# Patient Record
Sex: MALE | URBAN - METROPOLITAN AREA
[De-identification: names, ages, dates, MRNs, and addresses within clinical notes are randomized per-mention and may not be internally consistent; named-entity substitution may affect disease eponyms.]

---

## 2017-08-09 ENCOUNTER — IMPORTED ENCOUNTER (OUTPATIENT)
Dept: URBAN - METROPOLITAN AREA CLINIC 59 | Facility: CLINIC | Age: 47
End: 2017-08-09

## 2017-08-09 PROBLEM — H10.45 OTHER CHRONIC ALLERGIC CONJUNCTIVITIS: Noted: 2017-08-09

## 2017-08-09 PROBLEM — L71.8 OTHER ROSACEA: Noted: 2017-08-09

## 2017-08-09 PROBLEM — H01.024 SQUAMOUS BLEPHARITIS OF LT UPPER EYELID: Noted: 2017-08-09

## 2017-08-09 PROBLEM — I10 PRIMARY HYPERTENSION: Noted: 2017-08-09

## 2017-08-09 PROBLEM — H04.123 TEAR FILM INSUFFICIENCY OF BILATERAL LACRIMAL GLANDS: Noted: 2017-08-09

## 2017-08-09 PROBLEM — H01.021 SQUAMOUS BLEPHARITIS RIGHT UPPER EYELID: Noted: 2017-08-09

## 2017-08-09 PROCEDURE — 99213 OFFICE O/P EST LOW 20 MIN: CPT

## 2019-07-22 ENCOUNTER — IMPORTED ENCOUNTER (OUTPATIENT)
Dept: URBAN - METROPOLITAN AREA CLINIC 59 | Facility: CLINIC | Age: 49
End: 2019-07-22

## 2019-07-22 PROBLEM — H01.024 SQUAMOUS BLEPHARITIS OF LT UPPER EYELID: Noted: 2019-07-22

## 2019-07-22 PROBLEM — H01.021 SQUAMOUS BLEPHARITIS RIGHT UPPER EYELID: Noted: 2019-07-22

## 2019-07-22 PROBLEM — I10 PRIMARY HYPERTENSION: Noted: 2019-07-22

## 2019-07-22 PROBLEM — H10.45 OTHER CHRONIC ALLERGIC CONJUNCTIVITIS: Noted: 2019-07-22

## 2019-07-22 PROCEDURE — 92015 DETERMINE REFRACTIVE STATE: CPT

## 2019-07-22 PROCEDURE — 99213 OFFICE O/P EST LOW 20 MIN: CPT

## 2021-07-20 ENCOUNTER — IMPORTED ENCOUNTER (OUTPATIENT)
Dept: URBAN - METROPOLITAN AREA CLINIC 59 | Facility: CLINIC | Age: 51
End: 2021-07-20

## 2021-07-20 PROBLEM — I10 PRIMARY HYPERTENSION: Noted: 2021-07-20

## 2021-07-20 PROBLEM — H01.021 SQUAMOUS BLEPHARITIS RIGHT UPPER EYELID: Noted: 2021-07-20

## 2021-07-20 PROBLEM — H01.024 SQUAMOUS BLEPHARITIS OF LT UPPER EYELID: Noted: 2021-07-20

## 2021-07-20 PROBLEM — H10.45 OTHER CHRONIC ALLERGIC CONJUNCTIVITIS: Noted: 2021-07-20

## 2021-07-20 PROCEDURE — 99213 OFFICE O/P EST LOW 20 MIN: CPT

## 2021-07-20 PROCEDURE — 92015 DETERMINE REFRACTIVE STATE: CPT

## 2023-10-21 ASSESSMENT — VISUAL ACUITY
OU_CC: 20/30
OD_CC: 20/20
OD_CC: 20/20-1
OD_CC: 20/20
OS_CC: 20/20
OS_CC: 20/20
OS_CC: 20/20-1

## 2023-10-21 ASSESSMENT — TONOMETRY
OD_IOP_MMHG: 15
OD_IOP_MMHG: 16
OS_IOP_MMHG: 15
OS_IOP_MMHG: 16

## 2025-03-19 ENCOUNTER — APPOINTMENT (OUTPATIENT)
Dept: URBAN - METROPOLITAN AREA CLINIC 341 | Facility: CLINIC | Age: 55
Setting detail: DERMATOLOGY
End: 2025-03-19

## 2025-03-19 DIAGNOSIS — L259 CONTACT DERMATITIS AND OTHER ECZEMA, UNSPECIFIED CAUSE: ICD-10-CM | Status: INADEQUATELY CONTROLLED

## 2025-03-19 PROBLEM — L30.8 OTHER SPECIFIED DERMATITIS: Status: ACTIVE | Noted: 2025-03-19

## 2025-03-19 PROCEDURE — ? MDM - TREATMENT GOALS

## 2025-03-19 PROCEDURE — ? COUNSELING

## 2025-03-19 PROCEDURE — ? PRESCRIPTION MEDICATION MANAGEMENT

## 2025-03-19 PROCEDURE — 99204 OFFICE O/P NEW MOD 45 MIN: CPT

## 2025-03-19 PROCEDURE — ? PRESCRIPTION

## 2025-03-19 RX ORDER — TRIAMCINOLONE ACETONIDE 1 MG/G
CREAM TOPICAL
Qty: 80 | Refills: 3 | Status: ERX | COMMUNITY
Start: 2025-03-19

## 2025-03-19 RX ADMIN — TRIAMCINOLONE ACETONIDE: 1 CREAM TOPICAL at 00:00

## 2025-03-19 ASSESSMENT — LOCATION DETAILED DESCRIPTION DERM: LOCATION DETAILED: RIGHT NIPPLE

## 2025-03-19 ASSESSMENT — LOCATION ZONE DERM: LOCATION ZONE: TRUNK

## 2025-03-19 ASSESSMENT — LOCATION SIMPLE DESCRIPTION DERM: LOCATION SIMPLE: RIGHT CHEST

## 2025-03-19 NOTE — PROCEDURE: PRESCRIPTION MEDICATION MANAGEMENT
Detail Level: Zone
Render In Strict Bullet Format?: No
Initiate Treatment: triamcinolone acetonide 0.1 % topical cream : Apply twice daily to affected area on the chest x 2 weeks, then as needed for itching. Use up to 15 days per month.
Plan: Reviewed Free & Clear Skin Care Handout \\n- Discontinue any scented hygiene products including laundry detergent, dryer sheets or fabric softeners \\n- Use hypoallergenic body wash and moisturizer daily such as CeraVe, Cetaphil or Dove Unscented \\n- After showering, pat dry with a towel and apply moisturizer- CeraVe, Cetaphil, Eucerin, or Lubriderm, to entire body while still damp. Reapply as needed throughout the entire body.

## 2025-03-19 NOTE — PROCEDURE: COUNSELING
Cleanser Recommendations: Gentle cleansers without fragrance
Moisturizer Recommendations: Gentle moisturizing creams (not lotions) without fragrance. Reapply liberally and frequently throughout day as needed
Detail Level: Zone